# Patient Record
Sex: FEMALE | Race: WHITE | HISPANIC OR LATINO | Employment: FULL TIME | URBAN - METROPOLITAN AREA
[De-identification: names, ages, dates, MRNs, and addresses within clinical notes are randomized per-mention and may not be internally consistent; named-entity substitution may affect disease eponyms.]

---

## 2023-06-07 PROBLEM — J45.20 MILD INTERMITTENT ASTHMA, UNCOMPLICATED: Status: ACTIVE | Noted: 2018-11-19

## 2023-06-07 PROBLEM — N93.8 DUB (DYSFUNCTIONAL UTERINE BLEEDING): Status: ACTIVE | Noted: 2022-10-17

## 2023-06-07 PROBLEM — L50.2 URTICARIA DUE TO COLD: Status: ACTIVE | Noted: 2023-06-07

## 2023-06-07 PROBLEM — F34.1 DYSTHYMIA: Status: ACTIVE | Noted: 2017-11-28

## 2023-06-07 PROBLEM — J30.2 SEASONAL ALLERGIC RHINITIS: Status: ACTIVE | Noted: 2018-10-30

## 2023-12-18 ENCOUNTER — OFFICE VISIT (OUTPATIENT)
Dept: OBSTETRICS AND GYNECOLOGY | Facility: CLINIC | Age: 33
End: 2023-12-18
Payer: COMMERCIAL

## 2023-12-18 VITALS
BODY MASS INDEX: 38.01 KG/M2 | WEIGHT: 221.44 LBS | DIASTOLIC BLOOD PRESSURE: 92 MMHG | SYSTOLIC BLOOD PRESSURE: 136 MMHG

## 2023-12-18 DIAGNOSIS — Z12.4 ROUTINE CERVICAL SMEAR: ICD-10-CM

## 2023-12-18 DIAGNOSIS — N93.0 PCB (POST COITAL BLEEDING): ICD-10-CM

## 2023-12-18 DIAGNOSIS — Z01.419 WELL WOMAN EXAM WITH ROUTINE GYNECOLOGICAL EXAM: Primary | ICD-10-CM

## 2023-12-18 PROCEDURE — 99395 PR PREVENTIVE VISIT,EST,18-39: ICD-10-PCS | Mod: S$GLB,,, | Performed by: OBSTETRICS & GYNECOLOGY

## 2023-12-18 PROCEDURE — 87591 N.GONORRHOEAE DNA AMP PROB: CPT | Performed by: OBSTETRICS & GYNECOLOGY

## 2023-12-18 PROCEDURE — 88175 CYTOPATH C/V AUTO FLUID REDO: CPT | Performed by: OBSTETRICS & GYNECOLOGY

## 2023-12-18 PROCEDURE — 87624 HPV HI-RISK TYP POOLED RSLT: CPT | Performed by: OBSTETRICS & GYNECOLOGY

## 2023-12-18 PROCEDURE — 99999 PR PBB SHADOW E&M-EST. PATIENT-LVL III: ICD-10-PCS | Mod: PBBFAC,,, | Performed by: OBSTETRICS & GYNECOLOGY

## 2023-12-18 PROCEDURE — 99999 PR PBB SHADOW E&M-EST. PATIENT-LVL III: CPT | Mod: PBBFAC,,, | Performed by: OBSTETRICS & GYNECOLOGY

## 2023-12-18 PROCEDURE — 99395 PREV VISIT EST AGE 18-39: CPT | Mod: S$GLB,,, | Performed by: OBSTETRICS & GYNECOLOGY

## 2023-12-18 RX ORDER — ACETAMINOPHEN AND CODEINE PHOSPHATE 120; 12 MG/5ML; MG/5ML
1 SOLUTION ORAL DAILY
Qty: 84 TABLET | Refills: 3 | Status: SHIPPED | OUTPATIENT
Start: 2023-12-18

## 2023-12-18 NOTE — PROGRESS NOTES
OBSTETRIC HISTORY:   OB History    No obstetric history on file.          COMPREHENSIVE GYN HISTORY:  PAP History: Reports abnormal Paps.  Infection History: Denies STDs. Denies PID.  Benign History: Denies uterine fibroids. Denies ovarian cysts. Denies endometriosis.   Cancer History: Denies cervical cancer. Denies uterine cancer or hyperplasia. Denies ovarian cancer. Denies vulvar cancer or pre-cancer. Denies vaginal cancer or pre-cancer. Denies breast cancer. Denies colon cancer.  Sexual Activity History:  Sexually active with males   Menstrual History: Very irregular PCB and bleeding between cycles.  Dysmenorrhea History: Reports no dysmenorrhea.  Contraception: OCP    HPI:   33 y.o.  Patient's last menstrual period was 10/31/2023.   Patient is  here for her annual gynecologic exam.  She has GYN complaints of recent Nexplanon removal for BTB and still having BTB on OCP. Also PCB and very frustrated because affecting relationship. Blood pressure elevated on birth control. Does not really want IUD as has heard that it hurts to insert.. She denies bladder, breast and bowel complaints.    ROS:  GENERAL: No weight gain or weight loss.   CHEST: No shortness of breath.   ABDOMEN: No abdominal pain, constipation, diarrhea, nausea, vomiting or rectal bleeding.   URINARY: No frequency, dysuria, hematuria, or burning on urination.  REPRODUCTIVE: See HPI.   BREASTS: No breast pain, lumps, or nipple discharge.   PSYCHIATRIC: No depression or anxiety.    PE:   BP (!) 136/92   Wt 100.4 kg (221 lb 7.2 oz)   LMP 10/31/2023   BMI 38.01 kg/m²   APPEARANCE: Well nourished, well developed, in no acute distress.  NECK: Neck symmetric without  thyromegaly.  NODES: No inguinal, cervical lymph node enlargement.  CHEST: Lungs clear to auscultation.  HEART: Regular rate and rhythm, no murmurs, rubs or gallops.  ABDOMEN: Soft. No tenderness or masses. No hernias.  BREASTS: Symmetrical, no skin changes or visible lesions. No palpable  masses, nipple discharge or adenopathy bilaterally.  PELVIC:   VULVA: No lesions. Normal female genitalia.  URETHRAL MEATUS: Normal size and location, no lesions, no prolapse.  URETHRA: No masses, tenderness, prolapse or scarring.  VAGINA: Moist and well rugated, no discharge, no significant cystocele or rectocele.  CERVIX: No lesions and discharge.  UTERUS: Normal size, regular shape, mobile, non-tender, bladder base nontender.  ADNEXA: No masses or tenderness.    PROCEDURES:  Pap smear  HRHPV  GC/CT    Assessment:  Normal Gynecologic Exam  PCB--Pelvic US and GC/CT  Elevated BP on OCP--switch to progestin only pill for now and try to lose weight  RTO 2-3 months    Plan:  Mammogram and Colonoscopy if indicated by current recommendations.  Return to clinic in one year or for any problems or complaints.    Counseling:  Patient was counseled today on A.C.S. Pap guidelines and recommendations for yearly pelvic exams and monthly self breast exams; to see her PCP for other health maintenance. Regular exercise and healthy diet.     As of April 1, 2021, the Cures Act has been passed nationally. This new law requires that all doctors progress notes, lab results, pathology reports and radiology reports be released IMMEDIATELY to the patient in the patient portal. That means that the results are released to you at the EXACT same time they are released to me. Therefore, with all of the patients that I have I am not able to reply to each patient exactly when the results come in. So there will be a delay from when you see the results to when I see them and have time to come up with a response to send you. Also I only see these results when I am on the computer at work. So if the results come in over the weekend or after 5 pm of a work day, I will not see them until the next business day. As you can tell, this is a challenge as a physician to give every patient the quick response they hope for and deserve. So please be patient!      Thanks for understanding,

## 2023-12-19 LAB
C TRACH DNA SPEC QL NAA+PROBE: NOT DETECTED
N GONORRHOEA DNA SPEC QL NAA+PROBE: NOT DETECTED

## 2023-12-20 LAB
HPV HR 12 DNA SPEC QL NAA+PROBE: NEGATIVE
HPV16 AG SPEC QL: NEGATIVE
HPV18 DNA SPEC QL NAA+PROBE: NEGATIVE

## 2023-12-21 ENCOUNTER — HOSPITAL ENCOUNTER (OUTPATIENT)
Dept: RADIOLOGY | Facility: HOSPITAL | Age: 33
Discharge: HOME | End: 2023-12-21
Attending: OBSTETRICS & GYNECOLOGY
Payer: COMMERCIAL

## 2023-12-21 DIAGNOSIS — N93.0 PCB (POST COITAL BLEEDING): ICD-10-CM

## 2023-12-21 PROCEDURE — 76830 TRANSVAGINAL US NON-OB: CPT | Mod: TC

## 2023-12-21 PROCEDURE — 76830 TRANSVAGINAL US NON-OB: CPT | Mod: 26,,, | Performed by: RADIOLOGY

## 2023-12-21 PROCEDURE — 76856 US PELVIS COMP WITH TRANSVAG NON-OB (XPD): ICD-10-PCS | Mod: 26,,, | Performed by: RADIOLOGY

## 2023-12-21 PROCEDURE — 76856 US EXAM PELVIC COMPLETE: CPT | Mod: 26,,, | Performed by: RADIOLOGY

## 2023-12-21 PROCEDURE — 76830 US PELVIS COMP WITH TRANSVAG NON-OB (XPD): ICD-10-PCS | Mod: 26,,, | Performed by: RADIOLOGY

## 2024-01-03 LAB
FINAL PATHOLOGIC DIAGNOSIS: NORMAL
Lab: NORMAL

## 2024-11-18 ENCOUNTER — OFFICE VISIT (OUTPATIENT)
Dept: OBSTETRICS AND GYNECOLOGY | Facility: CLINIC | Age: 34
End: 2024-11-18
Payer: COMMERCIAL

## 2024-11-18 VITALS
BODY MASS INDEX: 38.52 KG/M2 | DIASTOLIC BLOOD PRESSURE: 89 MMHG | WEIGHT: 224.44 LBS | SYSTOLIC BLOOD PRESSURE: 132 MMHG

## 2024-11-18 DIAGNOSIS — Z01.419 WELL WOMAN EXAM WITH ROUTINE GYNECOLOGICAL EXAM: Primary | ICD-10-CM

## 2024-11-18 DIAGNOSIS — N63.12 MASS OF UPPER INNER QUADRANT OF RIGHT BREAST: ICD-10-CM

## 2024-11-18 DIAGNOSIS — Z12.4 ROUTINE CERVICAL SMEAR: ICD-10-CM

## 2024-11-18 PROCEDURE — 87624 HPV HI-RISK TYP POOLED RSLT: CPT | Performed by: OBSTETRICS & GYNECOLOGY

## 2024-11-18 PROCEDURE — 99999 PR PBB SHADOW E&M-EST. PATIENT-LVL III: CPT | Mod: PBBFAC,,, | Performed by: OBSTETRICS & GYNECOLOGY

## 2024-11-18 RX ORDER — NORETHINDRONE 0.35 MG/1
1 TABLET ORAL DAILY
Qty: 84 TABLET | Refills: 3 | Status: SHIPPED | OUTPATIENT
Start: 2024-11-18

## 2024-11-18 NOTE — PROGRESS NOTES
OBSTETRIC HISTORY:   OB History    No obstetric history on file.        COMPREHENSIVE GYN HISTORY:  PAP History: Reports abnormal Paps.  Infection History: Denies STDs. Denies PID.  Benign History: Denies uterine fibroids. Denies ovarian cysts. Denies endometriosis.   Cancer History: Denies cervical cancer. Denies uterine cancer or hyperplasia. Denies ovarian cancer. Denies vulvar cancer or pre-cancer. Denies vaginal cancer or pre-cancer. Denies breast cancer. Denies colon cancer.  Sexual Activity History:  Sexually active with males   Menstrual History: Very irregular PCB and bleeding between cycles.  Dysmenorrhea History: Reports no dysmenorrhea.  Contraception: OCP    HPI:   34 y.o.  Patient's last menstrual period was 11/11/2024.   Patient is  here for her annual gynecologic exam.  She has no GYN complaints. She denies bladder, breast and bowel complaints.    ROS:  GENERAL: No weight gain or weight loss.   CHEST: No shortness of breath.   ABDOMEN: No abdominal pain, constipation, diarrhea, nausea, vomiting or rectal bleeding.   URINARY: No frequency, dysuria, hematuria, or burning on urination.  REPRODUCTIVE: See HPI.   BREASTS: No breast pain, lumps, or nipple discharge.   PSYCHIATRIC: No depression or anxiety.    PE:   /89   Wt 101.8 kg (224 lb 7 oz)   LMP 11/11/2024   BMI 38.52 kg/m²   APPEARANCE: Well nourished, well developed, in no acute distress.  NECK: Neck symmetric without  thyromegaly.  NODES: No inguinal, cervical lymph node enlargement.  CHEST: Lungs clear to auscultation.  HEART: Regular rate and rhythm, no murmurs, rubs or gallops.  ABDOMEN: Soft. No tenderness or masses. No hernias.  BREASTS: Symmetrical, no skin changes or visible lesions. No nipple discharge or adenopathy bilaterally. Firm small mass at 1 o'clock symmetrical  PELVIC:   VULVA: No lesions. Normal female genitalia.  URETHRAL MEATUS: Normal size and location, no lesions, no prolapse.  URETHRA: No masses, tenderness,  prolapse or scarring.  VAGINA: Moist and well rugated, no discharge, no significant cystocele or rectocele.  CERVIX: No lesions and discharge.  UTERUS: Normal size, regular shape, mobile, non-tender, bladder base nontender.  ADNEXA: No masses or tenderness.    PROCEDURES:  Pap smear  HRHPV    Assessment:  Normal Gynecologic Exam  Breast mass --DX MMG and US if needed    Recommendations routine screening and no risk factors:  Mammogram at age 40  Colonoscopy age 45  Bone density age 65  Return to clinic as needed for any problems.  Return to clinic in one year. It is recommended to have a physician breast exam and pelvic exam annually. This is different than doing a Pap smear.         As of April 1, 2021, the Cures Act has been passed nationally. This new law requires that all doctors progress notes, lab results, pathology reports and radiology reports be released IMMEDIATELY to the patient in the patient portal. That means that the results are released to you at the EXACT same time they are released to me. Therefore, with all of the patients that I have I am not able to reply to each patient exactly when the results come in. So there will be a delay from when you see the results to when I see them and have time to come up with a response to send you. Also I only see these results when I am on the computer at work. So if the results come in over the weekend or after 5 pm of a work day, I will not see them until the next business day. As you can tell, this is a challenge as a physician to give every patient the quick response they hope for and deserve. So please be patient!     Thanks for understanding,

## 2024-11-26 ENCOUNTER — PATIENT MESSAGE (OUTPATIENT)
Dept: DERMATOLOGY | Facility: CLINIC | Age: 34
End: 2024-11-26

## 2024-11-26 ENCOUNTER — OFFICE VISIT (OUTPATIENT)
Dept: DERMATOLOGY | Facility: CLINIC | Age: 34
End: 2024-11-26
Payer: COMMERCIAL

## 2024-11-26 DIAGNOSIS — L65.9 HAIR THINNING: Primary | ICD-10-CM

## 2024-11-26 DIAGNOSIS — L65.9 HAIR THINNING: ICD-10-CM

## 2024-11-26 DIAGNOSIS — L70.0 ACNE VULGARIS: Primary | ICD-10-CM

## 2024-11-26 PROCEDURE — 99204 OFFICE O/P NEW MOD 45 MIN: CPT | Mod: S$GLB,,, | Performed by: PHYSICIAN ASSISTANT

## 2024-11-26 PROCEDURE — 1160F RVW MEDS BY RX/DR IN RCRD: CPT | Mod: CPTII,S$GLB,, | Performed by: PHYSICIAN ASSISTANT

## 2024-11-26 PROCEDURE — G2211 COMPLEX E/M VISIT ADD ON: HCPCS | Mod: S$GLB,,, | Performed by: PHYSICIAN ASSISTANT

## 2024-11-26 PROCEDURE — 1159F MED LIST DOCD IN RCRD: CPT | Mod: CPTII,S$GLB,, | Performed by: PHYSICIAN ASSISTANT

## 2024-11-26 PROCEDURE — 99999 PR PBB SHADOW E&M-EST. PATIENT-LVL III: CPT | Mod: PBBFAC,,, | Performed by: PHYSICIAN ASSISTANT

## 2024-11-26 RX ORDER — SPIRONOLACTONE 50 MG/1
TABLET, FILM COATED ORAL
Qty: 60 TABLET | Refills: 2 | Status: SHIPPED | OUTPATIENT
Start: 2024-11-26

## 2024-11-26 RX ORDER — KETOCONAZOLE 20 MG/ML
SHAMPOO, SUSPENSION TOPICAL
Qty: 120 ML | Refills: 6 | Status: SHIPPED | OUTPATIENT
Start: 2024-11-26

## 2024-11-26 NOTE — PATIENT INSTRUCTIONS
Acne Treatment    Retinoids (e.g. adapalene, tretinoin, tazarotene) are vitamin A derivatives that are the mainstay of acne therapy. The skin often becomes dry, red, or irritated when first using them--this is a normal period of adjustment.   Use only a pea-sized amount for the entire face to avoid excess irritation.   If your skin is sensitive, begin by using the medication two nights per week or every other night for the first couple of months until your skin adjusts.   Use as much oil-free moisturizer as needed to help your skin adjust to the retinoid.  There is no miracle, overnight cure for acne. It may take 6-8 weeks to start seeing some improvement, and you should continue to improve over the following months. It is important that you keep your follow up appointments so that any medication changes can be made if necessary.  Your acne may get worse before it gets better. This is normal! Just hang in there, incorporate the meds into your daily routine, and trust that the medication will work.   Do not scrub your face. Aggressive scrubbing can make acne worse. Gently washing your face 2x/day is essential to any successful acne regimen.   Do not pick or squeeze your pimples, as this will delay resolution of the picked/squeezed lesions and potentially lead to scarring. Acne is temporary, but scars are permanent.  Antibiotics: Antibiotics are sometimes prescribed to decrease acne by reducing inflammation. They are not a good long-term solution; they have side effects as all meds do; and they should always be used along with the topical treatments that are recommended.  Waxing: Stop using the retinoid 1 week prior to any waxing, as skin is more likely to tear.  Diet: Avoid eating foods with a high glycemic load/index (high sugar, simple carbs) which can worsen acne. Also, avoid drinking a lot of skim or low fat milk, and avoid the whey protein found in most protein shakes and bars, as these can also worsen  "acne.  Makeup: Use only oil-free, non-comedogenic makeup. Brands to consider include Neutrogena, Tarte, Bare Minerals, Parris Iredale, Michaela Mcintyre, Clinique. (Avoid MAC.)  For female patients: Discontinue all oral and topical acne medications if you become pregnant or are planning to become pregnant. Notify our office, and we will direct you to medications that are safe to use during pregnancy.  Acne vulgaris  -     spironolactone (ALDACTONE) 50 MG tablet; Start with 1 tablet by mouth daily, increase to 2 tablets daily as tolerated  Dispense: 60 tablet; Refill: 2  Morning acne regimen:  ?  Wash face with mild BP cleanser (sample)  ?  If skin feels dry, apply a fragrance-free moisturizer, such as CeraVe AM lotion  ?  Apply a broad-spectrum sunscreen with SPF 30 or higher (This is especially important to avoid "dark spots" that can follow an acne lesion.)    Evening acne regimen:  ?  Wash face with gentle cleanser. See below for suggestions.  ?  Apply a pea-sized amount of tretinoin 0.05%, azelaic acid, niacinamide (from ) to the entire face.    Tretinoin: We reviewed that a pea sized amount of the topical retinoid is to be applied to the entire face at night and that it is not spot treatment. Patient to use every other night or 3 nights a week and gradually increase to goal of nightly use (as tolerated). Side effects reviewed to include but not limited to redness, dryness, flaking, burning/tingling sensation, skin irritation, and feeling of tightness.     ?  Apply a fragrance-free moisturizer, such as CeraVe PM lotion, if needed for dryness.    Spironolactone:   Discussed benefits and risks of therapy including but not limited to breakthrough bleeding, breast tenderness, and elevated potassium levels which may give symptoms of fatigue, palpitations, and nausea. Patient should limit potassium intake - avoid potassium supplements or salt substitutes, limit bananas and citrus fruits. Pregnancy must be avoided while " taking spironolactone.    Hair thinning  Rogaine- can use  5%  To affected area 1x per day. Must use consistently and if you stop using, the hair it stimulated to grow may fall out over time. Generic acceptable    Discussed with patient that there are multiple over the counter hair supplements, few of which have proven efficacy in controlled clinical trials. However, anecdotal reports have indicated a benefit for these vitamins.  Handout reviewing active ingredients, allergies, and proper use were provided for Nutrafol and Viviscal. The patient can elect to take at his/her discretion. If taking biotin, patient should refrain from taking it 1 week before lab work.     Rosemary oil 3x per week     Zenegen shampoo     Spironolactone (100-200 mg per day)  Decreases the production of hormones that contribute to androgenetic alopecia  Adverse effects: Headache, decreased libido, menstrual irregularities, orthostatic hypotension, fatigue, and hyperkalemia. This drug is teratogenic and should be avoided in pregnancy. Proper birth control must be used if premenopausal.   Potassium will be monitored in patients over 45.       Message about potentially trying keto shampoo as well.           Follow up in about 3 months (around 2/26/2025).

## 2024-11-26 NOTE — PROGRESS NOTES
Subjective:      Patient ID:  Radha Enriquez is a 34 y.o. female who presents for   Chief Complaint   Patient presents with    Hair Loss    Acne     Her current face regimen is below. She reports her face is oily. She reports trying spironolactone in the past, but not taking it consistently enough.     AM: BP wash and vitamin C    PM: OTC retinol    Hair Loss - Initial  Affected locations: scalp  Duration: 8 years  Signs / symptoms: itching and scaling  Timing: constant  Aggravated by: nothing  Improvement on treatment: no relief    Acne - Initial  Affected locations: face and neck  Duration: 8 years  Signs / symptoms: itching, redness and pain  Severity: mild to moderate  Aggravated by: menses  Relieving factors/Treatments tried: OTC acne medication  Improvement on treatment: mild      Review of Systems   Skin:  Positive for itching.       Objective:   Physical Exam   Constitutional: She appears well-developed and well-nourished. No distress.   Neurological: She is alert and oriented to person, place, and time. She is not disoriented.   Psychiatric: She has a normal mood and affect.   Skin:   Areas Examined (abnormalities noted in diagram):   Scalp / Hair Palpated and Inspected  Head / Face Inspection Performed                              Diagram Legend     Erythematous scaling macule/papule c/w actinic keratosis       Vascular papule c/w angioma      Pigmented verrucoid papule/plaque c/w seborrheic keratosis      Yellow umbilicated papule c/w sebaceous hyperplasia      Irregularly shaped tan macule c/w lentigo     1-2 mm smooth white papules consistent with Milia      Movable subcutaneous cyst with punctum c/w epidermal inclusion cyst      Subcutaneous movable cyst c/w pilar cyst      Firm pink to brown papule c/w dermatofibroma      Pedunculated fleshy papule(s) c/w skin tag(s)      Evenly pigmented macule c/w junctional nevus     Mildly variegated pigmented, slightly irregular-bordered macule c/w mildly  "atypical nevus      Flesh colored to evenly pigmented papule c/w intradermal nevus       Pink pearly papule/plaque c/w basal cell carcinoma      Erythematous hyperkeratotic cursted plaque c/w SCC      Surgical scar with no sign of skin cancer recurrence      Open and closed comedones      Inflammatory papules and pustules      Verrucoid papule consistent consistent with wart     Erythematous eczematous patches and plaques     Dystrophic onycholytic nail with subungual debris c/w onychomycosis     Umbilicated papule    Erythematous-base heme-crusted tan verrucoid plaque consistent with inflamed seborrheic keratosis     Erythematous Silvery Scaling Plaque c/w Psoriasis     See annotation      Assessment / Plan:        Acne vulgaris  -     spironolactone (ALDACTONE) 50 MG tablet; Start with 1 tablet by mouth daily, increase to 2 tablets daily as tolerated  Dispense: 60 tablet; Refill: 2  Morning acne regimen:  ?  Wash face with mild BP cleanser (sample)  ?  If skin feels dry, apply a fragrance-free moisturizer, such as CeraVe AM lotion  ?  Apply a broad-spectrum sunscreen with SPF 30 or higher (This is especially important to avoid "dark spots" that can follow an acne lesion.)    Evening acne regimen:  ?  Wash face with gentle cleanser. See below for suggestions.  ?  Apply a pea-sized amount of tretinoin 0.05%, azelaic acid, niacinamide (from ) to the entire face.    Tretinoin: We reviewed that a pea sized amount of the topical retinoid is to be applied to the entire face at night and that it is not spot treatment. Patient to use every other night or 3 nights a week and gradually increase to goal of nightly use (as tolerated). Side effects reviewed to include but not limited to redness, dryness, flaking, burning/tingling sensation, skin irritation, and feeling of tightness.     ?  Apply a fragrance-free moisturizer, such as CeraVe PM lotion, if needed for dryness.    Spironolactone:   Discussed benefits and risks of " therapy including but not limited to breakthrough bleeding, breast tenderness, and elevated potassium levels which may give symptoms of fatigue, palpitations, and nausea. Patient should limit potassium intake - avoid potassium supplements or salt substitutes, limit bananas and citrus fruits. Pregnancy must be avoided while taking spironolactone.    Hair thinning  Rogaine- can use  5%  To affected area 1x per day. Must use consistently and if you stop using, the hair it stimulated to grow may fall out over time. Generic acceptable    Discussed with patient that there are multiple over the counter hair supplements, few of which have proven efficacy in controlled clinical trials. However, anecdotal reports have indicated a benefit for these vitamins.  Handout reviewing active ingredients, allergies, and proper use were provided for Nutrafol and Viviscal. The patient can elect to take at his/her discretion. If taking biotin, patient should refrain from taking it 1 week before lab work.     Rosemary oil 3x per week     Zenegen shampoo     Spironolactone (100-200 mg per day)  Decreases the production of hormones that contribute to androgenetic alopecia  Adverse effects: Headache, decreased libido, menstrual irregularities, orthostatic hypotension, fatigue, and hyperkalemia. This drug is teratogenic and should be avoided in pregnancy. Proper birth control must be used if premenopausal.   Potassium will be monitored in patients over 45.       Message about potentially trying keto shampoo as well.           Follow up in about 3 months (around 2/26/2025).

## 2024-12-27 ENCOUNTER — OFFICE VISIT (OUTPATIENT)
Dept: FAMILY MEDICINE | Facility: CLINIC | Age: 34
End: 2024-12-27
Payer: COMMERCIAL

## 2024-12-27 DIAGNOSIS — J01.90 ACUTE BACTERIAL SINUSITIS: Primary | ICD-10-CM

## 2024-12-27 DIAGNOSIS — J06.9 UPPER RESPIRATORY TRACT INFECTION, UNSPECIFIED TYPE: ICD-10-CM

## 2024-12-27 DIAGNOSIS — B96.89 ACUTE BACTERIAL SINUSITIS: Primary | ICD-10-CM

## 2024-12-27 RX ORDER — BENZONATATE 200 MG/1
200 CAPSULE ORAL 3 TIMES DAILY PRN
Qty: 30 CAPSULE | Refills: 0 | Status: SHIPPED | OUTPATIENT
Start: 2024-12-27 | End: 2025-01-06

## 2024-12-27 RX ORDER — LEVOCETIRIZINE DIHYDROCHLORIDE 5 MG/1
5 TABLET, FILM COATED ORAL NIGHTLY
Qty: 30 TABLET | Refills: 11 | Status: SHIPPED | OUTPATIENT
Start: 2024-12-27 | End: 2025-12-27

## 2024-12-27 RX ORDER — AMOXICILLIN AND CLAVULANATE POTASSIUM 875; 125 MG/1; MG/1
1 TABLET, FILM COATED ORAL EVERY 12 HOURS
Qty: 14 TABLET | Refills: 0 | Status: SHIPPED | OUTPATIENT
Start: 2024-12-27 | End: 2025-01-03

## 2024-12-27 NOTE — PROGRESS NOTES
Subjective     Patient ID: Radha Enriquez is a 34 y.o. female.    Chief Complaint: Nasal Congestion and Cough        Patient is a 34 year old female with no significant medical problems that presents as a new patient to me via virtual visit for c/o cough and congestion X 7- 8 days. Denies fever, CP, SOB, abdominal pain, n/v/d. Endorses nasal congestion, post-nasal drip, sore throat, runny nose, and dry cough. Has been taking OTC Mucinex for symptoms with minimal relief. Concerned it will turn into a sinus infection as similar symptoms have done in the past.     Cough  This is a new problem. The current episode started in the past 7 days. The problem has been gradually worsening. The problem occurs every few minutes. The cough is Non-productive. Associated symptoms include ear congestion, nasal congestion, postnasal drip, rhinorrhea and a sore throat. Pertinent negatives include no chest pain, chills, ear pain, fever, headaches, heartburn, hemoptysis, myalgias, rash, sweats, weight loss or wheezing. The symptoms are aggravated by cold air, dust and lying down. She has tried OTC cough suppressant for the symptoms. The treatment provided mild relief. Her past medical history is significant for asthma. There is no history of bronchiectasis, bronchitis, COPD, emphysema, environmental allergies or pneumonia.     Review of Systems   Constitutional:  Negative for chills, fatigue, fever and weight loss.   HENT:  Positive for nasal congestion, postnasal drip, rhinorrhea and sore throat. Negative for ear pain.    Eyes:  Negative for pain and discharge.   Respiratory:  Positive for cough. Negative for hemoptysis and wheezing.    Cardiovascular:  Negative for chest pain and palpitations.   Gastrointestinal:  Negative for abdominal pain and heartburn.   Musculoskeletal:  Negative for myalgias.   Integumentary:  Negative for rash.   Allergic/Immunologic: Negative for environmental allergies.   Neurological:  Negative for  dizziness and headaches.   Psychiatric/Behavioral:  Negative for confusion.    History reviewed. No pertinent past medical history.    History reviewed. No pertinent surgical history.    Family History   Problem Relation Name Age of Onset    Hypertension Mother      Hyperlipidemia Father         Social History     Socioeconomic History    Marital status: Single   Tobacco Use    Smoking status: Never    Smokeless tobacco: Never     Social Drivers of Health     Financial Resource Strain: Low Risk  (11/19/2024)    Overall Financial Resource Strain (CARDIA)     Difficulty of Paying Living Expenses: Not very hard   Food Insecurity: No Food Insecurity (11/19/2024)    Hunger Vital Sign     Worried About Running Out of Food in the Last Year: Never true     Ran Out of Food in the Last Year: Never true   Physical Activity: Insufficiently Active (11/19/2024)    Exercise Vital Sign     Days of Exercise per Week: 3 days     Minutes of Exercise per Session: 40 min   Stress: No Stress Concern Present (11/19/2024)    [employer] - Occupational Stress Questionnaire     Feeling of Stress : Only a little   Housing Stability: Unknown (11/19/2024)    Housing Stability Vital Sign     Unable to Pay for Housing in the Last Year: No       Current Outpatient Medications   Medication Sig Dispense Refill    albuterol (VENTOLIN HFA) 90 mcg/actuation inhaler Inhale 2 puffs into the lungs every 6 (six) hours as needed for Wheezing. Rescue 18 g 0    amoxicillin-clavulanate 875-125mg (AUGMENTIN) 875-125 mg per tablet Take 1 tablet by mouth every 12 (twelve) hours. for 7 days 14 tablet 0    benzonatate (TESSALON) 200 MG capsule Take 1 capsule (200 mg total) by mouth 3 (three) times daily as needed for Cough. 30 capsule 0    ketoconazole (NIZORAL) 2 % shampoo Wash hair with medicated shampoo at least 2x/week - let sit on scalp at least 5 minutes prior to rinsing 120 mL 6    levocetirizine (XYZAL) 5 MG tablet Take 1 tablet  (5 mg total) by mouth every evening. 30 tablet 11    norethindrone (MICRONOR) 0.35 mg tablet Take 1 tablet (0.35 mg total) by mouth once daily. 84 tablet 3    spironolactone (ALDACTONE) 50 MG tablet Start with 1 tablet by mouth daily, increase to 2 tablets daily as tolerated 60 tablet 2     No current facility-administered medications for this visit.       Review of patient's allergies indicates:  No Known Allergies        Objective     There were no vitals filed for this visit.   Physical Exam  Vitals (no VS available) and nursing note reviewed.   Constitutional:       General: She is not in acute distress.     Appearance: Normal appearance. She is not ill-appearing.   HENT:      Head: Normocephalic and atraumatic.      Nose: Congestion present.   Eyes:      General: No scleral icterus.        Right eye: No discharge.         Left eye: No discharge.      Extraocular Movements: Extraocular movements intact.      Conjunctiva/sclera: Conjunctivae normal.   Pulmonary:      Effort: Pulmonary effort is normal. No respiratory distress.   Musculoskeletal:      Cervical back: Normal range of motion.   Neurological:      Mental Status: She is alert and oriented to person, place, and time.   Psychiatric:         Mood and Affect: Mood normal.         Behavior: Behavior normal.         Cognition and Memory: Cognition and memory normal.       Physical exam limited due to virtual visit.         Assessment and Plan     1. Acute bacterial sinusitis  -     amoxicillin-clavulanate 875-125mg (AUGMENTIN) 875-125 mg per tablet; Take 1 tablet by mouth every 12 (twelve) hours. for 7 days  Dispense: 14 tablet; Refill: 0  -     levocetirizine (XYZAL) 5 MG tablet; Take 1 tablet (5 mg total) by mouth every evening.  Dispense: 30 tablet; Refill: 11  -     benzonatate (TESSALON) 200 MG capsule; Take 1 capsule (200 mg total) by mouth 3 (three) times daily as needed for Cough.  Dispense: 30 capsule; Refill: 0    2. Upper respiratory tract  infection, unspecified type  -     amoxicillin-clavulanate 875-125mg (AUGMENTIN) 875-125 mg per tablet; Take 1 tablet by mouth every 12 (twelve) hours. for 7 days  Dispense: 14 tablet; Refill: 0  -     levocetirizine (XYZAL) 5 MG tablet; Take 1 tablet (5 mg total) by mouth every evening.  Dispense: 30 tablet; Refill: 11  -     benzonatate (TESSALON) 200 MG capsule; Take 1 capsule (200 mg total) by mouth 3 (three) times daily as needed for Cough.  Dispense: 30 capsule; Refill: 0        New problem; congestion and cough started about 7-8 days ago; progressively worsening, denies fever/chills/ear pain; +sore throat, +congestion, +dry cough.  Start taking antibiotics in 2-3 days as discussed if s/s worsen or fail to improve due to history of sinus infections. Take other medications as prescribed. At home care discussed. Patient verbalized understanding. RTC if s/s worsen for in-person evaluation. Follow up with PCP.          Follow up if symptoms worsen or fail to improve for in person visit.    The patient location is: [place]   The chief complaint leading to consultation is: cough/congestion  Total time spent with patient: 20 minutes  Visit type: Virtual visit with synchronous audio and video  Each patient to whom he or she provides medical services by telemedicine is: (1) informed of the relationship between the physician and patient and the respective role of any other health care provider with respect to management of the patient; and (2) notified that he or she may decline to receive medical services by telemedicine and may withdraw from such care at any time.     Veda Cross, MSN, APRN, FNP-C  Family Medicine  Office #[phone]

## 2024-12-31 ENCOUNTER — HOSPITAL ENCOUNTER (OUTPATIENT)
Dept: RADIOLOGY | Facility: HOSPITAL | Age: 34
Discharge: HOME | End: 2024-12-31
Attending: OBSTETRICS & GYNECOLOGY
Payer: COMMERCIAL

## 2024-12-31 DIAGNOSIS — N63.12 MASS OF UPPER INNER QUADRANT OF RIGHT BREAST: ICD-10-CM

## 2024-12-31 PROCEDURE — 77062 BREAST TOMOSYNTHESIS BI: CPT | Mod: 26,,, | Performed by: RADIOLOGY

## 2024-12-31 PROCEDURE — 77066 DX MAMMO INCL CAD BI: CPT | Mod: TC

## 2024-12-31 PROCEDURE — 77066 DX MAMMO INCL CAD BI: CPT | Mod: 26,,, | Performed by: RADIOLOGY

## 2025-05-26 DIAGNOSIS — L70.0 ACNE VULGARIS: ICD-10-CM

## 2025-05-27 RX ORDER — SPIRONOLACTONE 50 MG/1
TABLET, FILM COATED ORAL
Qty: 60 TABLET | Refills: 2 | OUTPATIENT
Start: 2025-05-27

## 2025-07-07 ENCOUNTER — OFFICE VISIT (OUTPATIENT)
Dept: FAMILY MEDICINE | Facility: CLINIC | Age: 35
End: 2025-07-07
Payer: COMMERCIAL

## 2025-07-07 DIAGNOSIS — L30.9 DERMATITIS: Primary | ICD-10-CM

## 2025-07-07 DIAGNOSIS — R21 RASH: ICD-10-CM

## 2025-07-07 PROCEDURE — 1160F RVW MEDS BY RX/DR IN RCRD: CPT | Mod: CPTII,95,,

## 2025-07-07 PROCEDURE — 98006 SYNCH AUDIO-VIDEO EST MOD 30: CPT | Mod: 95,,,

## 2025-07-07 PROCEDURE — 1159F MED LIST DOCD IN RCRD: CPT | Mod: CPTII,95,,

## 2025-07-07 RX ORDER — TRIAMCINOLONE ACETONIDE 1 MG/G
OINTMENT TOPICAL 2 TIMES DAILY
Qty: 15 G | Refills: 0 | Status: SHIPPED | OUTPATIENT
Start: 2025-07-07 | End: 2025-07-18

## 2025-07-07 NOTE — PROGRESS NOTES
The patient location is: [place]  The chief complaint leading to consultation is: rash    Visit type: audiovisual    Face to Face time with patient: 20 minutes    30 minutes of total time spent on the encounter, which includes face to face time and non-face to face time preparing to see the patient (eg, review of tests), Obtaining and/or reviewing separately obtained history, Documenting clinical information in the electronic or other health record, Independently interpreting results (not separately reported) and communicating results to the patient/family/caregiver, or Care coordination (not separately reported).     Each patient to whom he or she provides medical services by telemedicine is:  (1) informed of the relationship between the physician and patient and the respective role of any other health care provider with respect to management of the patient; and (2) notified that he or she may decline to receive medical services by telemedicine and may withdraw from such care at any time.    Notes:     Subjective     Patient ID: Radha Enriquez is a 34 y.o. female.    Chief Complaint: Rash    Patient is a 34 year old female with history of asthma that presents as a new patient to me via virtual visit for rash to left wrist. Started with rash over two weeks ago, went away, and then came back. Denies rash being anywhere else. Denies pain or fever. Endorses itching and dry skin. Has been using OTC creams and ointments.       Review of Systems   Constitutional:  Negative for activity change and unexpected weight change.   HENT:  Negative for hearing loss, rhinorrhea and trouble swallowing.    Eyes:  Negative for discharge and visual disturbance.   Respiratory:  Negative for chest tightness and wheezing.    Cardiovascular:  Negative for chest pain and palpitations.   Gastrointestinal:  Negative for blood in stool, constipation, diarrhea and vomiting.   Endocrine: Negative for polydipsia and polyuria.    Genitourinary:  Negative for difficulty urinating, dysuria, hematuria and menstrual problem.   Musculoskeletal:  Negative for arthralgias, joint swelling and neck pain.   Integumentary:  Positive for rash.   Neurological:  Negative for weakness and headaches.   Psychiatric/Behavioral:  Negative for confusion and dysphoric mood.    History reviewed. No pertinent past medical history.    History reviewed. No pertinent surgical history.    Family History   Problem Relation Name Age of Onset    Hypertension Mother      Hyperlipidemia Father         Social History     Socioeconomic History    Marital status: Single   Tobacco Use    Smoking status: Never    Smokeless tobacco: Never     Social Drivers of Health     Financial Resource Strain: Low Risk  (7/5/2025)    Overall Financial Resource Strain (CARDIA)     Difficulty of Paying Living Expenses: Not hard at all   Food Insecurity: No Food Insecurity (7/5/2025)    Hunger Vital Sign     Worried About Running Out of Food in the Last Year: Never true     Ran Out of Food in the Last Year: Never true   Transportation Needs: No Transportation Needs (7/5/2025)    PRAPARE - Transportation     Lack of Transportation (Medical): No     Lack of Transportation (Non-Medical): No   Physical Activity: Insufficiently Active (7/5/2025)    Exercise Vital Sign     Days of Exercise per Week: 2 days     Minutes of Exercise per Session: 20 min   Stress: No Stress Concern Present (7/5/2025)    [employer] [employer] - Occupational Stress Questionnaire     Feeling of Stress : Only a little   Housing Stability: Low Risk  (7/5/2025)    Housing Stability Vital Sign     Unable to Pay for Housing in the Last Year: No     Homeless in the Last Year: No       Current Medications[1]    Review of patient's allergies indicates:  No Known Allergies        Objective     There were no vitals filed for this visit.   Physical Exam  Nursing note reviewed. Vitals reviewed: no  VS.  Constitutional:       General: She is not in acute distress.     Appearance: Normal appearance. She is not ill-appearing.   HENT:      Head: Normocephalic and atraumatic.   Eyes:      General: No scleral icterus.        Right eye: No discharge.         Left eye: No discharge.      Extraocular Movements: Extraocular movements intact.      Conjunctiva/sclera: Conjunctivae normal.   Pulmonary:      Effort: Pulmonary effort is normal. No respiratory distress.   Musculoskeletal:      Cervical back: Normal range of motion.   Skin:     Findings: Rash present. Rash is scaling.             Comments: Small scaly, dry spot to left wrist.    Neurological:      Mental Status: She is alert and oriented to person, place, and time.   Psychiatric:         Mood and Affect: Mood normal.         Behavior: Behavior normal.         Cognition and Memory: Cognition and memory normal.       Physical exam limited due to virtual visit.       Assessment and Plan     1. Dermatitis  2. Rash  - New problem; noticed over two weeks ago- went away then came back. Rash present to left wrist only. Denies anand. Endorses itching. Apply ointment as prescribed BID X 7 days. May use OTC unscented cream or ointments such as Aquaphor, Cerave, Cetaphil. Apply to site daily. Avoid triggers, scented lotions/soaps/perfumes/detergents.  - RTC if s/s worsen for in person evaluation.   -     triamcinolone acetonide 0.1% (KENALOG) 0.1 % ointment; Apply topically 2 (two) times daily. for 7 days  Dispense: 15 g; Refill: 0           Follow up in 2 weeks (on 7/21/2025), or if symptoms worsen or fail to improve.      ER/Urgent Care precautions discussed with patient. Go to ER for new or worsening symptoms.         Veda Cross, MSN, APRN, FNP-C  Family Medicine  Office #[phone]                      [1]   Current Outpatient Medications   Medication Sig Dispense Refill    albuterol (VENTOLIN HFA) 90 mcg/actuation inhaler Inhale 2 puffs into the lungs every 6  (six) hours as needed for Wheezing. Rescue 18 g 0    ketoconazole (NIZORAL) 2 % shampoo Wash hair with medicated shampoo at least 2x/week - let sit on scalp at least 5 minutes prior to rinsing 120 mL 6    levocetirizine (XYZAL) 5 MG tablet Take 1 tablet (5 mg total) by mouth every evening. 30 tablet 11    norethindrone (MICRONOR) 0.35 mg tablet Take 1 tablet (0.35 mg total) by mouth once daily. 84 tablet 3    triamcinolone acetonide 0.1% (KENALOG) 0.1 % ointment Apply topically 2 (two) times daily. for 7 days 15 g 0     No current facility-administered medications for this visit.